# Patient Record
Sex: FEMALE | Race: WHITE | NOT HISPANIC OR LATINO
[De-identification: names, ages, dates, MRNs, and addresses within clinical notes are randomized per-mention and may not be internally consistent; named-entity substitution may affect disease eponyms.]

---

## 2018-08-21 PROBLEM — Z00.00 ENCOUNTER FOR PREVENTIVE HEALTH EXAMINATION: Status: ACTIVE | Noted: 2018-08-21

## 2022-10-26 ENCOUNTER — NON-APPOINTMENT (OUTPATIENT)
Age: 72
End: 2022-10-26

## 2022-10-27 PROBLEM — R92.1 BREAST CALCIFICATIONS: Status: ACTIVE | Noted: 2022-10-27

## 2022-10-27 PROBLEM — Z98.890 S/P LEFT BREAST BIOPSY: Status: ACTIVE | Noted: 2022-10-27

## 2022-10-27 PROBLEM — Z12.31 OTHER SCREENING MAMMOGRAM: Status: ACTIVE | Noted: 2022-10-27

## 2022-10-28 ENCOUNTER — NON-APPOINTMENT (OUTPATIENT)
Age: 72
End: 2022-10-28

## 2022-10-28 ENCOUNTER — APPOINTMENT (OUTPATIENT)
Dept: BREAST CENTER | Facility: CLINIC | Age: 72
End: 2022-10-28

## 2022-10-28 VITALS
WEIGHT: 152 LBS | SYSTOLIC BLOOD PRESSURE: 153 MMHG | DIASTOLIC BLOOD PRESSURE: 83 MMHG | HEART RATE: 60 BPM | BODY MASS INDEX: 28.7 KG/M2 | HEIGHT: 61 IN

## 2022-10-28 DIAGNOSIS — Z98.890 OTHER SPECIFIED POSTPROCEDURAL STATES: ICD-10-CM

## 2022-10-28 DIAGNOSIS — Z87.891 PERSONAL HISTORY OF NICOTINE DEPENDENCE: ICD-10-CM

## 2022-10-28 DIAGNOSIS — Z87.39 PERSONAL HISTORY OF OTHER DISEASES OF THE MUSCULOSKELETAL SYSTEM AND CONNECTIVE TISSUE: ICD-10-CM

## 2022-10-28 DIAGNOSIS — Z87.2 PERSONAL HISTORY OF DISEASES OF THE SKIN AND SUBCUTANEOUS TISSUE: ICD-10-CM

## 2022-10-28 DIAGNOSIS — Z80.3 FAMILY HISTORY OF MALIGNANT NEOPLASM OF BREAST: ICD-10-CM

## 2022-10-28 DIAGNOSIS — Z78.9 OTHER SPECIFIED HEALTH STATUS: ICD-10-CM

## 2022-10-28 DIAGNOSIS — R92.1 MAMMOGRAPHIC CALCIFICATION FOUND ON DIAGNOSTIC IMAGING OF BREAST: ICD-10-CM

## 2022-10-28 DIAGNOSIS — Z12.31 ENCOUNTER FOR SCREENING MAMMOGRAM FOR MALIGNANT NEOPLASM OF BREAST: ICD-10-CM

## 2022-10-28 DIAGNOSIS — Z87.19 PERSONAL HISTORY OF OTHER DISEASES OF THE DIGESTIVE SYSTEM: ICD-10-CM

## 2022-10-28 PROCEDURE — 99203 OFFICE O/P NEW LOW 30 MIN: CPT

## 2022-10-28 RX ORDER — TRIAMCINOLONE ACETONIDE 1 MG/G
0.1 OINTMENT TOPICAL
Qty: 80 | Refills: 0 | Status: ACTIVE | COMMUNITY
Start: 2022-10-25

## 2022-10-28 RX ORDER — VENLAFAXINE HYDROCHLORIDE 37.5 MG/1
37.5 CAPSULE, EXTENDED RELEASE ORAL
Qty: 30 | Refills: 0 | Status: ACTIVE | COMMUNITY
Start: 2022-08-01

## 2022-10-28 RX ORDER — MULTIVIT-MIN/FA/LYCOPEN/LUTEIN .4-300-25
TABLET ORAL
Refills: 0 | Status: ACTIVE | COMMUNITY

## 2022-10-28 RX ORDER — PSYLLIUM HUSK 0.4 G
CAPSULE ORAL
Refills: 0 | Status: ACTIVE | COMMUNITY

## 2022-10-28 RX ORDER — THIAMINE HCL 100 MG
TABLET ORAL
Refills: 0 | Status: ACTIVE | COMMUNITY

## 2022-10-28 RX ORDER — PRIMIDONE 50 MG/1
50 TABLET ORAL
Qty: 240 | Refills: 0 | Status: ACTIVE | COMMUNITY
Start: 2022-10-02

## 2022-10-28 RX ORDER — SILVER SULFADIAZINE 10 MG/G
1 CREAM TOPICAL
Qty: 20 | Refills: 0 | Status: ACTIVE | COMMUNITY
Start: 2022-07-27

## 2022-10-28 RX ORDER — TURMERIC ROOT EXTRACT 500 MG
TABLET ORAL
Refills: 0 | Status: ACTIVE | COMMUNITY

## 2022-10-28 RX ORDER — VALACYCLOVIR 1 G/1
1 TABLET, FILM COATED ORAL
Qty: 4 | Refills: 0 | Status: ACTIVE | COMMUNITY
Start: 2022-06-24

## 2022-10-28 RX ORDER — CELECOXIB 200 MG/1
200 CAPSULE ORAL
Qty: 30 | Refills: 0 | Status: ACTIVE | COMMUNITY
Start: 2022-10-24

## 2022-10-28 RX ORDER — MIRTAZAPINE 7.5 MG/1
7.5 TABLET, FILM COATED ORAL
Qty: 30 | Refills: 0 | Status: ACTIVE | COMMUNITY
Start: 2022-06-21

## 2022-10-28 NOTE — PHYSICAL EXAM
[Normocephalic] : normocephalic [Atraumatic] : atraumatic [Supple] : supple [No Supraclavicular Adenopathy] : no supraclavicular adenopathy [Examined in the supine and seated position] : examined in the supine and seated position [Symmetrical] : symmetrical [No dominant masses] : no dominant masses in right breast  [No dominant masses] : no dominant masses left breast [No Nipple Retraction] : no left nipple retraction [No Nipple Discharge] : no left nipple discharge [No Axillary Lymphadenopathy] : no left axillary lymphadenopathy [No Edema] : no edema [No Rashes] : no rashes [No Ulceration] : no ulceration [de-identified] : excellent cosmesis, no collections, healed wise pattern\par R>L [de-identified] : moderate brawny induration c/w rtx, in area of concern areola there is an eczematoid rash scattered, healed axillary incision, lateral and superior to her Radiation tattoo there is a possible blood blister

## 2022-10-28 NOTE — ASSESSMENT
[FreeTextEntry1] : 71 yo female with h/o right breast cancer, ER+, LORETTA\par plan mg/sono OCT 2023\par patient states she is reluctant to MRI discussed with her that her tumor was seen only on MRI\par she has concerns over cost and dye allergy\par discussed CAT as an option for 2023 - patient prefers CAT - will likely need to premedicate with steroids and Benadryl\par recommend moisturizer\par We reviewed risk reduction strategies including maintaining a BMI <25, limiting red meat intake and alcoholic beverages to 3 per week and exercise (150 min/ week low intensity or 75 min/week high intensity). And maintaining a normal vitamin D level.\par \par plan f/u 1 year after imaging\par She knows to call or return sooner should any concerns or questions arise.\par \par

## 2022-10-28 NOTE — REVIEW OF SYSTEMS
[Chills] : chills [Recent Weight Gain (___ Lbs)] : recent [unfilled] ~Ulb weight gain [Eyesight Problems] : eyesight problems [Dry Eyes] : dryness of the eyes [SOB on Exertion] : shortness of breath during exertion [Incontinence] : incontinence [Limb Pain] : limb pain [Skin Wound] : skin wound [Itching] : itching [Confused] : confusion [Sleep Disturbances] : sleep disturbances [Anxiety] : anxiety [Depression] : depression [Emotional Problems] : emotional problems [Negative] : Heme/Lymph [Fever] : no fever [Shortness Of Breath] : no shortness of breath [FreeTextEntry2] : Fatigue [FreeTextEntry9] : Joint pain, Hand pain, lower back pain [de-identified] : Reddening of the breast, Breast warmth, Breast itching [de-identified] : Hair loss

## 2022-10-28 NOTE — HISTORY OF PRESENT ILLNESS
[FreeTextEntry1] : This is a \par \par She does SBE.\par She has not noticed a change in her breast or a breast lump.\par She has not noticed a change in her nipple or nipple area.\par She has not noticed a change in the skin of the breast.\par She is not experiencing nipple discharge.\par She is not experiencing breast pain.\par She has not noticed a lump or lymph node under the armpit.\par \par BREAST CANCER RISK FACTORS\par Menarche:\par Date of LMP:\par Menopause:\par Grav:      Para:\par Age at first live birth:\par Nursed:\par Hysterectomy:\par Oophorectomy:\par OCP:\par HRT:\par Last pap/pelvic exam:\par Related family history:\par Ashkenazi:\par Mastery risk assessment:\par BRCA testing:\par Bra size:\par  \par Last mammogram:                              Location:\par Report reviewed.                                 Images reviewed.\par Results:\par \par Last ultrasound:                                   Location:\par Report reviewed.                                 Images reviewed.\par Results:\par \par Last MRI:                                             Location:\par Report reviewed. \par Results:\par \par  \par \par

## 2022-10-28 NOTE — PHYSICAL EXAM
[Normocephalic] : normocephalic [Atraumatic] : atraumatic [Supple] : supple [No Supraclavicular Adenopathy] : no supraclavicular adenopathy [Examined in the supine and seated position] : examined in the supine and seated position [Symmetrical] : symmetrical [No dominant masses] : no dominant masses in right breast  [No dominant masses] : no dominant masses left breast [No Nipple Retraction] : no left nipple retraction [No Nipple Discharge] : no left nipple discharge [No Axillary Lymphadenopathy] : no left axillary lymphadenopathy [No Edema] : no edema [No Rashes] : no rashes [No Ulceration] : no ulceration [de-identified] : excellent cosmesis, no collections, healed wise pattern\par R>L [de-identified] : moderate brawny induration c/w rtx, in area of concern areola there is an eczematoid rash scattered, healed axillary incision, lateral and superior to her Radiation tattoo there is a possible blood blister

## 2022-10-28 NOTE — ASSESSMENT
[FreeTextEntry1] : 73 yo female with h/o right breast cancer, ER+, LORETTA\par plan mg/sono OCT 2023\par patient states she is reluctant to MRI discussed with her that her tumor was seen only on MRI\par she has concerns over cost and dye allergy\par discussed CAT as an option for 2023 - patient prefers CAT - will likely need to premedicate with steroids and Benadryl\par recommend moisturizer\par We reviewed risk reduction strategies including maintaining a BMI <25, limiting red meat intake and alcoholic beverages to 3 per week and exercise (150 min/ week low intensity or 75 min/week high intensity). And maintaining a normal vitamin D level.\par \par plan f/u 1 year after imaging\par She knows to call or return sooner should any concerns or questions arise.\par \par

## 2022-10-28 NOTE — CONSULT LETTER
[Dear  ___] : Dear  [unfilled], [( Thank you for referring [unfilled] for consultation for _____ )] : Thank you for referring [unfilled] for consultation for [unfilled] [Please see my note below.] : Please see my note below. [Consult Closing:] : Thank you very much for allowing me to participate in the care of this patient.  If you have any questions, please do not hesitate to contact me. [Sincerely,] : Sincerely, [DrTalita  ___] : Dr. MOISE [FreeTextEntry3] : Laurence Genao MS DO\par Breast Surgeon\par [facility] \[place] [address]\par

## 2022-10-28 NOTE — REVIEW OF SYSTEMS
[Chills] : chills [Recent Weight Gain (___ Lbs)] : recent [unfilled] ~Ulb weight gain [Eyesight Problems] : eyesight problems [Dry Eyes] : dryness of the eyes [SOB on Exertion] : shortness of breath during exertion [Incontinence] : incontinence [Limb Pain] : limb pain [Skin Wound] : skin wound [Itching] : itching [Confused] : confusion [Sleep Disturbances] : sleep disturbances [Anxiety] : anxiety [Depression] : depression [Emotional Problems] : emotional problems [Negative] : Heme/Lymph [Fever] : no fever [Shortness Of Breath] : no shortness of breath [FreeTextEntry2] : Fatigue [FreeTextEntry9] : Joint pain, Hand pain, lower back pain [de-identified] : Reddening of the breast, Breast warmth, Breast itching [de-identified] : Hair loss

## 2022-10-28 NOTE — END OF VISIT
Patient:  Erika Falk Location:  [place]   :  Aug 14, 1923 Attending Physician:  Camilo Crowder M.D.     Date:  Sep 12, 2017 Note Type: Phone Triage Note     PRESCRIPTION RENEWAL NOTE    Date:  Sep 12, 2017   Time: 1645    Patient: Erika Falk    Diagnosis: Primary - M32.9 - Systemic lupus erythematosus, unspecified,    Diagnosed Aug 25, 2009   (Active)    Person Requesting: Erika-  just passed away    Electronically signed by:    Kaci Mckinney            [Time Spent: ___ minutes] : I have spent [unfilled] minutes of time on the encounter. [>50% of the face to face encounter time was spent on counseling and/or coordination of care for ___] : Greater than 50% of the face to face encounter time was spent on counseling and/or coordination of care for [unfilled]

## 2022-10-28 NOTE — CONSULT LETTER
[Dear  ___] : Dear  [unfilled], [( Thank you for referring [unfilled] for consultation for _____ )] : Thank you for referring [unfilled] for consultation for [unfilled] [Please see my note below.] : Please see my note below. [Consult Closing:] : Thank you very much for allowing me to participate in the care of this patient.  If you have any questions, please do not hesitate to contact me. [Sincerely,] : Sincerely, [DrTalita  ___] : Dr. MOISE [FreeTextEntry3] : Laurence Genao MS DO\par Breast Surgeon\par [facility] \[place] [address]\par

## 2023-06-30 DIAGNOSIS — Z91.041 RADIOGRAPHIC DYE ALLERGY STATUS: ICD-10-CM

## 2023-10-04 ENCOUNTER — NON-APPOINTMENT (OUTPATIENT)
Age: 73
End: 2023-10-04

## 2023-10-11 ENCOUNTER — RESULT REVIEW (OUTPATIENT)
Age: 73
End: 2023-10-11

## 2023-11-14 ENCOUNTER — APPOINTMENT (OUTPATIENT)
Dept: BREAST CENTER | Facility: CLINIC | Age: 73
End: 2023-11-14
Payer: MEDICARE

## 2023-11-14 VITALS
WEIGHT: 149 LBS | SYSTOLIC BLOOD PRESSURE: 133 MMHG | DIASTOLIC BLOOD PRESSURE: 75 MMHG | BODY MASS INDEX: 28.13 KG/M2 | HEART RATE: 61 BPM | HEIGHT: 61 IN

## 2023-11-14 DIAGNOSIS — D05.02 LOBULAR CARCINOMA IN SITU OF RIGHT BREAST: ICD-10-CM

## 2023-11-14 DIAGNOSIS — R92.2 INCONCLUSIVE MAMMOGRAM: ICD-10-CM

## 2023-11-14 DIAGNOSIS — C50.919 MALIGNANT NEOPLASM OF UNSPECIFIED SITE OF UNSPECIFIED FEMALE BREAST: ICD-10-CM

## 2023-11-14 DIAGNOSIS — D05.01 LOBULAR CARCINOMA IN SITU OF RIGHT BREAST: ICD-10-CM

## 2023-11-14 DIAGNOSIS — Z12.31 ENCOUNTER FOR SCREENING MAMMOGRAM FOR MALIGNANT NEOPLASM OF BREAST: ICD-10-CM

## 2023-11-14 DIAGNOSIS — R92.30 INCONCLUSIVE MAMMOGRAM: ICD-10-CM

## 2023-11-14 DIAGNOSIS — C50.911 MALIGNANT NEOPLASM OF UNSPECIFIED SITE OF RIGHT FEMALE BREAST: ICD-10-CM

## 2023-11-14 PROCEDURE — 99214 OFFICE O/P EST MOD 30 MIN: CPT

## 2023-11-14 RX ORDER — DIPHENHYDRAMINE HCL 50 MG/1
50 CAPSULE ORAL
Qty: 1 | Refills: 0 | Status: DISCONTINUED | COMMUNITY
Start: 2023-07-05 | End: 2023-11-14

## 2023-11-14 RX ORDER — METHYLPREDNISOLONE 32 MG/1
32 TABLET ORAL
Qty: 2 | Refills: 0 | Status: DISCONTINUED | COMMUNITY
Start: 2023-07-05 | End: 2023-11-14

## 2023-12-19 ENCOUNTER — RESULT REVIEW (OUTPATIENT)
Age: 73
End: 2023-12-19

## 2023-12-19 ENCOUNTER — APPOINTMENT (OUTPATIENT)
Dept: RHEUMATOLOGY | Facility: CLINIC | Age: 73
End: 2023-12-19
Payer: MEDICARE

## 2023-12-19 VITALS
BODY MASS INDEX: 28.51 KG/M2 | OXYGEN SATURATION: 99 % | SYSTOLIC BLOOD PRESSURE: 120 MMHG | HEIGHT: 61 IN | HEART RATE: 82 BPM | WEIGHT: 151 LBS | DIASTOLIC BLOOD PRESSURE: 66 MMHG

## 2023-12-19 DIAGNOSIS — M19.042 PRIMARY OSTEOARTHRITIS, RIGHT HAND: ICD-10-CM

## 2023-12-19 DIAGNOSIS — M19.041 PRIMARY OSTEOARTHRITIS, RIGHT HAND: ICD-10-CM

## 2023-12-19 PROCEDURE — 99203 OFFICE O/P NEW LOW 30 MIN: CPT

## 2023-12-19 RX ORDER — AMINO ACIDS/MV,IRON,MIN
TABLET ORAL
Refills: 0 | Status: ACTIVE | COMMUNITY

## 2023-12-19 NOTE — HISTORY OF PRESENT ILLNESS
[FreeTextEntry1] : 74yo F with PMHx breast CA in the office  History: hand pain years distal finger deformities and deviation no synoviti no dactyltiis on NSAIDs no synovitis no dactyltiis for the past year stable no new discomfort of worsening pain planning treatment for hand tremors including botox injections.

## 2023-12-19 NOTE — END OF VISIT
PRESENTED TO THE ER FOR C/O WORSENING LOWER ABD PAINX 3 DAYS. LMP STARTED 
YESTERDAY AND PT IN PROCESS OF IVF. PT ENDORSED HEAVY VAGINAL BLEEDING WHICH 
PER HER AND HER  WAS TOLD TO BE EXPECTED SURING IVF PROCESS. PT 
AMBULATORY TO THE BATHROOM. URINE SAMPLE WAS OBTAINED. AND PLACED PT IN BED 16 
ER. ON MONITOR. VSS. WILL CONT TO MONITOR, [Time Spent: ___ minutes] : I have spent [unfilled] minutes of time on the encounter.

## 2023-12-19 NOTE — PHYSICAL EXAM
[General Appearance - Alert] : alert [Sclera] : the sclera and conjunctiva were normal [Neck Appearance] : the appearance of the neck was normal [Auscultation Breath Sounds / Voice Sounds] : lungs were clear to auscultation bilaterally [Heart Rate And Rhythm] : heart rate was normal and rhythm regular [Heart Sounds] : normal S1 and S2 [FreeTextEntry1] : dip deviations and heberden nodes bilaterally [] : no rash [No Focal Deficits] : no focal deficits [Oriented To Time, Place, And Person] : oriented to person, place, and time

## 2024-11-18 DIAGNOSIS — R92.2 INCONCLUSIVE MAMMOGRAM: ICD-10-CM

## 2024-11-18 DIAGNOSIS — R92.30 INCONCLUSIVE MAMMOGRAM: ICD-10-CM

## 2024-11-19 ENCOUNTER — APPOINTMENT (OUTPATIENT)
Dept: BREAST CENTER | Facility: CLINIC | Age: 74
End: 2024-11-19
Payer: MEDICARE

## 2024-11-19 VITALS
HEIGHT: 61 IN | WEIGHT: 135 LBS | BODY MASS INDEX: 25.49 KG/M2 | SYSTOLIC BLOOD PRESSURE: 120 MMHG | HEART RATE: 68 BPM | DIASTOLIC BLOOD PRESSURE: 72 MMHG

## 2024-11-19 DIAGNOSIS — Z78.9 OTHER SPECIFIED HEALTH STATUS: ICD-10-CM

## 2024-11-19 DIAGNOSIS — D05.01 LOBULAR CARCINOMA IN SITU OF RIGHT BREAST: ICD-10-CM

## 2024-11-19 DIAGNOSIS — D05.02 LOBULAR CARCINOMA IN SITU OF RIGHT BREAST: ICD-10-CM

## 2024-11-19 DIAGNOSIS — C50.919 MALIGNANT NEOPLASM OF UNSPECIFIED SITE OF UNSPECIFIED FEMALE BREAST: ICD-10-CM

## 2024-11-19 DIAGNOSIS — Z80.3 FAMILY HISTORY OF MALIGNANT NEOPLASM OF BREAST: ICD-10-CM

## 2024-11-19 DIAGNOSIS — Z12.31 ENCOUNTER FOR SCREENING MAMMOGRAM FOR MALIGNANT NEOPLASM OF BREAST: ICD-10-CM

## 2024-11-19 PROCEDURE — 99213 OFFICE O/P EST LOW 20 MIN: CPT

## 2024-11-21 ENCOUNTER — RESULT REVIEW (OUTPATIENT)
Age: 74
End: 2024-11-21